# Patient Record
Sex: FEMALE | Race: WHITE | URBAN - METROPOLITAN AREA
[De-identification: names, ages, dates, MRNs, and addresses within clinical notes are randomized per-mention and may not be internally consistent; named-entity substitution may affect disease eponyms.]

---

## 2021-04-21 ENCOUNTER — OFFICE VISIT (OUTPATIENT)
Dept: URBAN - METROPOLITAN AREA CLINIC 60 | Facility: CLINIC | Age: 86
End: 2021-04-21

## 2021-05-07 ENCOUNTER — OFFICE VISIT (OUTPATIENT)
Dept: URBAN - METROPOLITAN AREA SURGERY CENTER 4 | Facility: SURGERY CENTER | Age: 86
End: 2021-05-07

## 2021-05-12 ENCOUNTER — OFFICE VISIT (OUTPATIENT)
Dept: URBAN - METROPOLITAN AREA CLINIC 60 | Facility: CLINIC | Age: 86
End: 2021-05-12

## 2021-05-26 ENCOUNTER — OFFICE VISIT (OUTPATIENT)
Dept: URBAN - METROPOLITAN AREA CLINIC 60 | Facility: CLINIC | Age: 86
End: 2021-05-26

## 2021-06-23 ENCOUNTER — OFFICE VISIT (OUTPATIENT)
Dept: URBAN - METROPOLITAN AREA CLINIC 60 | Facility: CLINIC | Age: 86
End: 2021-06-23

## 2021-06-30 ENCOUNTER — OFFICE VISIT (OUTPATIENT)
Dept: URBAN - METROPOLITAN AREA CLINIC 60 | Facility: CLINIC | Age: 86
End: 2021-06-30

## 2021-08-02 ENCOUNTER — OFFICE VISIT (OUTPATIENT)
Dept: URBAN - METROPOLITAN AREA CLINIC 60 | Facility: CLINIC | Age: 86
End: 2021-08-02

## 2022-02-23 ENCOUNTER — OFFICE VISIT (OUTPATIENT)
Dept: URBAN - METROPOLITAN AREA CLINIC 60 | Facility: CLINIC | Age: 87
End: 2022-02-23

## 2022-04-13 ENCOUNTER — OFFICE VISIT (OUTPATIENT)
Dept: URBAN - METROPOLITAN AREA CLINIC 60 | Facility: CLINIC | Age: 87
End: 2022-04-13

## 2022-05-18 ENCOUNTER — OFFICE VISIT (OUTPATIENT)
Dept: URBAN - METROPOLITAN AREA CLINIC 60 | Facility: CLINIC | Age: 87
End: 2022-05-18

## 2022-06-29 ENCOUNTER — OFFICE VISIT (OUTPATIENT)
Dept: URBAN - METROPOLITAN AREA CLINIC 60 | Facility: CLINIC | Age: 87
End: 2022-06-29

## 2022-07-08 ENCOUNTER — OFFICE VISIT (OUTPATIENT)
Dept: URBAN - METROPOLITAN AREA CLINIC 60 | Facility: CLINIC | Age: 87
End: 2022-07-08

## 2022-07-09 ENCOUNTER — TELEPHONE ENCOUNTER (OUTPATIENT)
Dept: URBAN - METROPOLITAN AREA CLINIC 121 | Facility: CLINIC | Age: 87
End: 2022-07-09

## 2022-07-09 RX ORDER — METHYLPREDNISOLONE 4 MG/1
TABLET ORAL
Refills: 0 | OUTPATIENT
Start: 2022-02-21 | End: 2022-02-23

## 2022-07-09 RX ORDER — OXCARBAZEPINE 300 MG/1
TABLET, FILM COATED ORAL
Refills: 0 | OUTPATIENT
Start: 2022-02-23 | End: 2022-04-13

## 2022-07-09 RX ORDER — ALPRAZOLAM 0.25 MG
TABLET ORAL
Refills: 0 | OUTPATIENT
Start: 2022-02-23 | End: 2022-04-13

## 2022-07-09 RX ORDER — CHOLECALCIFEROL (VITAMIN D3) 50 MCG
TABLET ORAL
Refills: 0 | OUTPATIENT
Start: 2021-04-21 | End: 2021-05-12

## 2022-07-09 RX ORDER — OXYCODONE HYDROCHLORIDE AND ACETAMINOPHEN 5; 325 MG/1; MG/1
TABLET ORAL
Refills: 0 | OUTPATIENT
Start: 2021-04-20 | End: 2021-04-21

## 2022-07-09 RX ORDER — MV-MIN/FOLIC/VIT K/LYCOP/COQ10 200-100MCG
CAPSULE ORAL
Refills: 0 | OUTPATIENT
Start: 2022-02-23 | End: 2022-04-13

## 2022-07-09 RX ORDER — GABAPENTIN 300 MG/1
CAPSULE ORAL
Refills: 0 | OUTPATIENT
Start: 2021-06-29 | End: 2021-08-02

## 2022-07-09 RX ORDER — ALPRAZOLAM 0.25 MG
TABLET ORAL
Refills: 0 | OUTPATIENT
Start: 2021-04-21 | End: 2021-04-21

## 2022-07-09 RX ORDER — ELECTROLYTES/DEXTROSE
SOLUTION, ORAL ORAL
Refills: 0 | OUTPATIENT
Start: 2022-02-23 | End: 2022-04-13

## 2022-07-09 RX ORDER — BRINZOLAMIDE 10 MG/ML
SUSPENSION/ DROPS OPHTHALMIC
Refills: 0 | OUTPATIENT
Start: 2022-04-18 | End: 2022-05-18

## 2022-07-09 RX ORDER — MV-MIN/FOLIC/VIT K/LYCOP/COQ10 200-100MCG
CAPSULE ORAL
Refills: 0 | OUTPATIENT
Start: 2021-08-02 | End: 2022-02-23

## 2022-07-09 RX ORDER — MV-MIN/FOLIC/VIT K/LYCOP/COQ10 200-100MCG
CAPSULE ORAL
Refills: 0 | OUTPATIENT
Start: 2021-04-21 | End: 2021-05-12

## 2022-07-09 RX ORDER — HYDROCODONE BITARTRATE AND ACETAMINOPHEN 5; 325 MG/1; MG/1
TABLET ORAL
Refills: 0 | OUTPATIENT
Start: 2022-03-30 | End: 2022-04-13

## 2022-07-09 RX ORDER — OXYCODONE HYDROCHLORIDE AND ACETAMINOPHEN 5; 325 MG/1; MG/1
TABLET ORAL AS NEEDED
Refills: 0 | OUTPATIENT
Start: 2021-04-21 | End: 2021-05-12

## 2022-07-09 RX ORDER — BRIMONIDINE TARTRATE, TIMOLOL MALEATE 2; 5 MG/ML; MG/ML
SOLUTION/ DROPS OPHTHALMIC
Refills: 0 | OUTPATIENT
Start: 2022-04-13 | End: 2022-05-18

## 2022-07-09 RX ORDER — BACLOFEN 10 MG/1
TABLET ORAL
Refills: 0 | OUTPATIENT
Start: 2022-03-16 | End: 2022-05-18

## 2022-07-09 RX ORDER — OXYCODONE HYDROCHLORIDE AND ACETAMINOPHEN 5; 325 MG/1; MG/1
TABLET ORAL AS NEEDED
Refills: 0 | OUTPATIENT
Start: 2022-02-23 | End: 2022-04-13

## 2022-07-09 RX ORDER — CIPROFLOXACIN HCL 500 MG
TWICE A DAY TABLET ORAL TWICE A DAY
Refills: 1 | OUTPATIENT
Start: 2021-04-30 | End: 2022-02-18

## 2022-07-09 RX ORDER — GABAPENTIN 300 MG/1
CAPSULE ORAL
Refills: 0 | OUTPATIENT
Start: 2022-04-13 | End: 2022-05-18

## 2022-07-09 RX ORDER — OXYCODONE HYDROCHLORIDE AND ACETAMINOPHEN 5; 325 MG/1; MG/1
TABLET ORAL AS NEEDED
Refills: 0 | OUTPATIENT
Start: 2022-04-13 | End: 2022-04-13

## 2022-07-09 RX ORDER — OXYCODONE HYDROCHLORIDE AND ACETAMINOPHEN 5; 325 MG/1; MG/1
TABLET ORAL AS NEEDED
Refills: 0 | OUTPATIENT
Start: 2021-05-12 | End: 2021-08-02

## 2022-07-09 RX ORDER — ALPRAZOLAM 0.25 MG
TABLET ORAL
Refills: 0 | OUTPATIENT
Start: 2021-04-21 | End: 2021-05-12

## 2022-07-09 RX ORDER — ALPRAZOLAM 0.25 MG
TABLET ORAL
Refills: 0 | OUTPATIENT
Start: 2022-05-18 | End: 2022-05-18

## 2022-07-09 RX ORDER — BRIMONIDINE TARTRATE, TIMOLOL MALEATE 2; 5 MG/ML; MG/ML
SOLUTION/ DROPS OPHTHALMIC
Refills: 0 | OUTPATIENT
Start: 2021-06-29 | End: 2022-02-23

## 2022-07-09 RX ORDER — MV-MIN/FOLIC/VIT K/LYCOP/COQ10 200-100MCG
CAPSULE ORAL
Refills: 0 | OUTPATIENT
Start: 2021-05-12 | End: 2021-08-02

## 2022-07-09 RX ORDER — OMEGA-3/DHA/EPA/FISH OIL 300-1000MG
CAPSULE ORAL
Refills: 0 | OUTPATIENT
Start: 2022-02-23 | End: 2022-04-13

## 2022-07-09 RX ORDER — IBUPROFEN 200 MG/1
TABLET, COATED ORAL ONCE A DAY
Refills: 0 | OUTPATIENT
Start: 2021-04-21 | End: 2021-05-12

## 2022-07-09 RX ORDER — ELECTROLYTES/DEXTROSE
SOLUTION, ORAL ORAL
Refills: 0 | OUTPATIENT
Start: 2022-04-13 | End: 2022-05-18

## 2022-07-09 RX ORDER — BACLOFEN 10 MG/1
TABLET ORAL
Refills: 0 | OUTPATIENT
Start: 2022-02-05 | End: 2022-02-23

## 2022-07-09 RX ORDER — ALPRAZOLAM 0.25 MG
TABLET ORAL
Refills: 0 | OUTPATIENT
Start: 2021-04-20 | End: 2021-04-21

## 2022-07-09 RX ORDER — IBUPROFEN 200 MG/1
TABLET, COATED ORAL ONCE A DAY
Refills: 0 | OUTPATIENT
Start: 2022-04-13 | End: 2022-05-18

## 2022-07-09 RX ORDER — OXCARBAZEPINE 300 MG/1
TABLET, FILM COATED ORAL
Refills: 0 | OUTPATIENT
Start: 2021-06-29 | End: 2022-02-23

## 2022-07-09 RX ORDER — OXCARBAZEPINE 600 MG/1
TABLET ORAL
Refills: 0 | OUTPATIENT
Start: 2021-04-20 | End: 2021-04-21

## 2022-07-09 RX ORDER — GABAPENTIN 300 MG/1
CAPSULE ORAL
Refills: 0 | OUTPATIENT
Start: 2022-02-21 | End: 2022-02-23

## 2022-07-09 RX ORDER — ALPRAZOLAM 0.25 MG
TABLET ORAL
Refills: 0 | OUTPATIENT
Start: 2021-08-02 | End: 2022-02-23

## 2022-07-09 RX ORDER — MV-MIN/FOLIC/VIT K/LYCOP/COQ10 200-100MCG
CAPSULE ORAL
Refills: 0 | OUTPATIENT
Start: 2022-04-13 | End: 2022-05-18

## 2022-07-09 RX ORDER — OXCARBAZEPINE 600 MG/1
TABLET ORAL
Refills: 0 | OUTPATIENT
Start: 2021-04-21 | End: 2021-05-12

## 2022-07-09 RX ORDER — METRONIDAZOLE 500 MG/1
THREE TIMES A DAY TABLET ORAL THREE TIMES A DAY
Refills: 0 | OUTPATIENT
Start: 2021-04-30 | End: 2022-02-18

## 2022-07-09 RX ORDER — OXCARBAZEPINE 600 MG/1
TABLET ORAL
Refills: 0 | OUTPATIENT
Start: 2021-08-02 | End: 2021-08-02

## 2022-07-09 RX ORDER — IBUPROFEN 200 MG/1
TABLET, COATED ORAL ONCE A DAY
Refills: 0 | OUTPATIENT
Start: 2021-05-12 | End: 2021-08-02

## 2022-07-09 RX ORDER — ALPRAZOLAM 0.25 MG
TABLET ORAL
Refills: 0 | OUTPATIENT
Start: 2022-04-13 | End: 2022-05-18

## 2022-07-09 RX ORDER — ALPRAZOLAM 0.25 MG
TABLET ORAL
Refills: 0 | OUTPATIENT
Start: 2021-05-12 | End: 2021-08-02

## 2022-07-09 RX ORDER — OXCARBAZEPINE 300 MG/1
TABLET, FILM COATED ORAL
Refills: 0 | OUTPATIENT
Start: 2022-04-13 | End: 2022-05-18

## 2022-07-09 RX ORDER — IBUPROFEN 200 MG/1
TABLET, COATED ORAL ONCE A DAY
Refills: 0 | OUTPATIENT
Start: 2021-08-02 | End: 2022-02-23

## 2022-07-09 RX ORDER — CHOLECALCIFEROL (VITAMIN D3) 50 MCG
TABLET ORAL
Refills: 0 | OUTPATIENT
Start: 2021-05-12 | End: 2021-08-02

## 2022-07-09 RX ORDER — GABAPENTIN 300 MG/1
CAPSULE ORAL
Refills: 0 | OUTPATIENT
Start: 2022-02-23 | End: 2022-04-13

## 2022-07-09 RX ORDER — OXYCODONE HYDROCHLORIDE AND ACETAMINOPHEN 5; 325 MG/1; MG/1
TABLET ORAL
Refills: 0 | OUTPATIENT
Start: 2021-04-21 | End: 2021-04-21

## 2022-07-09 RX ORDER — BRIMONIDINE TARTRATE, TIMOLOL MALEATE 2; 5 MG/ML; MG/ML
SOLUTION/ DROPS OPHTHALMIC
Refills: 0 | OUTPATIENT
Start: 2022-02-23 | End: 2022-04-13

## 2022-07-09 RX ORDER — CHOLECALCIFEROL (VITAMIN D3) 50 MCG
TABLET ORAL
Refills: 0 | OUTPATIENT
Start: 2021-08-02 | End: 2022-02-23

## 2022-07-09 RX ORDER — CHOLECALCIFEROL (VITAMIN D3) 50 MCG
TABLET ORAL
Refills: 0 | OUTPATIENT
Start: 2022-04-13 | End: 2022-05-18

## 2022-07-09 RX ORDER — OMEGA-3/DHA/EPA/FISH OIL 300-1000MG
CAPSULE ORAL
Refills: 0 | OUTPATIENT
Start: 2022-04-13 | End: 2022-05-18

## 2022-07-09 RX ORDER — IBUPROFEN 200 MG/1
TABLET, COATED ORAL ONCE A DAY
Refills: 0 | OUTPATIENT
Start: 2022-02-23 | End: 2022-04-13

## 2022-07-09 RX ORDER — OXCARBAZEPINE 600 MG/1
TABLET ORAL
Refills: 0 | OUTPATIENT
Start: 2021-05-12 | End: 2021-08-02

## 2022-07-09 RX ORDER — OXYCODONE HYDROCHLORIDE AND ACETAMINOPHEN 5; 325 MG/1; MG/1
TABLET ORAL AS NEEDED
Refills: 0 | OUTPATIENT
Start: 2021-08-02 | End: 2022-02-23

## 2022-07-09 RX ORDER — CHOLECALCIFEROL (VITAMIN D3) 50 MCG
TABLET ORAL
Refills: 0 | OUTPATIENT
Start: 2022-02-23 | End: 2022-04-13

## 2022-07-09 RX ORDER — LATANOPROSTENE BUNOD 0.24 MG/ML
SOLUTION/ DROPS OPHTHALMIC
Refills: 0 | OUTPATIENT
Start: 2022-05-12 | End: 2022-05-18

## 2022-07-10 ENCOUNTER — TELEPHONE ENCOUNTER (OUTPATIENT)
Dept: URBAN - METROPOLITAN AREA CLINIC 121 | Facility: CLINIC | Age: 87
End: 2022-07-10

## 2022-07-10 RX ORDER — METHYLPREDNISOLONE 4 MG/1
TABLET ORAL
Refills: 0 | Status: ACTIVE | COMMUNITY
Start: 2022-02-23

## 2022-07-10 RX ORDER — ELECTROLYTES/DEXTROSE
SOLUTION, ORAL ORAL
Refills: 0 | Status: ACTIVE | COMMUNITY
Start: 2022-05-18

## 2022-07-10 RX ORDER — GABAPENTIN 300 MG/1
CAPSULE ORAL
Refills: 0 | Status: ACTIVE | COMMUNITY
Start: 2022-05-18

## 2022-07-10 RX ORDER — IBUPROFEN 200 MG/1
TABLET, COATED ORAL ONCE A DAY
Refills: 0 | Status: ACTIVE | COMMUNITY
Start: 2022-05-18

## 2022-07-10 RX ORDER — OMEPRAZOLE 20 MG/1
ONCE A DAY TABLET, DELAYED RELEASE ORAL ONCE A DAY
Refills: 2 | Status: ACTIVE | COMMUNITY
Start: 2022-02-23

## 2022-07-10 RX ORDER — CHOLECALCIFEROL (VITAMIN D3) 50 MCG
TABLET ORAL
Refills: 0 | Status: ACTIVE | COMMUNITY
Start: 2022-05-18

## 2022-07-10 RX ORDER — LATANOPROSTENE BUNOD 0.24 MG/ML
SOLUTION/ DROPS OPHTHALMIC
Refills: 0 | Status: ACTIVE | COMMUNITY
Start: 2022-05-18

## 2022-07-10 RX ORDER — BRIMONIDINE TARTRATE, TIMOLOL MALEATE 2; 5 MG/ML; MG/ML
SOLUTION/ DROPS OPHTHALMIC
Refills: 0 | Status: ACTIVE | COMMUNITY
Start: 2022-05-18

## 2022-07-10 RX ORDER — BRINZOLAMIDE 10 MG/ML
SUSPENSION/ DROPS OPHTHALMIC
Refills: 0 | Status: ACTIVE | COMMUNITY
Start: 2022-05-18

## 2022-07-10 RX ORDER — GABAPENTIN 300 MG/1
CAPSULE ORAL
Refills: 0 | Status: ACTIVE | COMMUNITY
Start: 2021-08-02

## 2022-07-10 RX ORDER — ALPRAZOLAM 0.25 MG
TABLET ORAL
Refills: 0 | Status: ACTIVE | COMMUNITY
Start: 2022-05-18

## 2022-07-10 RX ORDER — OXCARBAZEPINE 300 MG/1
TABLET, FILM COATED ORAL
Refills: 0 | Status: ACTIVE | COMMUNITY
Start: 2022-05-18

## 2022-07-10 RX ORDER — OMEGA-3/DHA/EPA/FISH OIL 300-1000MG
CAPSULE ORAL
Refills: 0 | Status: ACTIVE | COMMUNITY
Start: 2022-05-18

## 2022-07-10 RX ORDER — BACLOFEN 10 MG/1
TABLET ORAL
Refills: 0 | Status: ACTIVE | COMMUNITY
Start: 2022-05-18

## 2022-07-10 RX ORDER — MV-MIN/FOLIC/VIT K/LYCOP/COQ10 200-100MCG
CAPSULE ORAL
Refills: 0 | Status: ACTIVE | COMMUNITY
Start: 2022-05-18

## 2022-07-10 RX ORDER — BACLOFEN 10 MG/1
TABLET ORAL
Refills: 0 | Status: ACTIVE | COMMUNITY
Start: 2022-02-23

## 2022-07-13 ENCOUNTER — OFFICE VISIT (OUTPATIENT)
Dept: URBAN - METROPOLITAN AREA CLINIC 60 | Facility: CLINIC | Age: 87
End: 2022-07-13
Payer: MEDICARE

## 2022-07-13 ENCOUNTER — WEB ENCOUNTER (OUTPATIENT)
Dept: URBAN - METROPOLITAN AREA CLINIC 60 | Facility: CLINIC | Age: 87
End: 2022-07-13

## 2022-07-13 VITALS
SYSTOLIC BLOOD PRESSURE: 128 MMHG | WEIGHT: 159.4 LBS | BODY MASS INDEX: 27.21 KG/M2 | HEART RATE: 72 BPM | RESPIRATION RATE: 12 BRPM | TEMPERATURE: 97 F | OXYGEN SATURATION: 97 % | HEIGHT: 64 IN | DIASTOLIC BLOOD PRESSURE: 72 MMHG

## 2022-07-13 DIAGNOSIS — K59.01 SLOW TRANSIT CONSTIPATION: ICD-10-CM

## 2022-07-13 PROBLEM — 35298007: Status: ACTIVE | Noted: 2022-07-13

## 2022-07-13 PROCEDURE — 99213 OFFICE O/P EST LOW 20 MIN: CPT | Performed by: PHYSICIAN ASSISTANT

## 2022-07-13 RX ORDER — ELECTROLYTES/DEXTROSE
SOLUTION, ORAL ORAL
Refills: 0 | Status: ACTIVE | COMMUNITY
Start: 2022-05-18

## 2022-07-13 RX ORDER — OXCARBAZEPINE 300 MG/1
TABLET, FILM COATED ORAL
Refills: 0 | Status: ACTIVE | COMMUNITY
Start: 2022-05-18

## 2022-07-13 RX ORDER — OMEPRAZOLE 20 MG/1
ONCE A DAY TABLET, DELAYED RELEASE ORAL ONCE A DAY
Refills: 2 | Status: ACTIVE | COMMUNITY
Start: 2022-02-23

## 2022-07-13 RX ORDER — METHYLPREDNISOLONE 4 MG/1
TABLET ORAL
Refills: 0 | Status: ACTIVE | COMMUNITY
Start: 2022-02-23

## 2022-07-13 RX ORDER — LATANOPROSTENE BUNOD 0.24 MG/ML
SOLUTION/ DROPS OPHTHALMIC
Refills: 0 | Status: ACTIVE | COMMUNITY
Start: 2022-05-18

## 2022-07-13 RX ORDER — MV-MIN/FOLIC/VIT K/LYCOP/COQ10 200-100MCG
CAPSULE ORAL
Refills: 0 | Status: ACTIVE | COMMUNITY
Start: 2022-05-18

## 2022-07-13 RX ORDER — BRINZOLAMIDE 10 MG/ML
SUSPENSION/ DROPS OPHTHALMIC
Refills: 0 | Status: ACTIVE | COMMUNITY
Start: 2022-05-18

## 2022-07-13 RX ORDER — BRIMONIDINE TARTRATE, TIMOLOL MALEATE 2; 5 MG/ML; MG/ML
SOLUTION/ DROPS OPHTHALMIC
Refills: 0 | Status: ACTIVE | COMMUNITY
Start: 2022-05-18

## 2022-07-13 RX ORDER — IBUPROFEN 200 MG/1
TABLET, COATED ORAL ONCE A DAY
Refills: 0 | Status: ACTIVE | COMMUNITY
Start: 2022-05-18

## 2022-07-13 RX ORDER — ALPRAZOLAM 0.25 MG
TABLET ORAL
Refills: 0 | Status: ACTIVE | COMMUNITY
Start: 2022-05-18

## 2022-07-13 RX ORDER — GABAPENTIN 300 MG/1
CAPSULE ORAL
Refills: 0 | Status: ACTIVE | COMMUNITY
Start: 2021-08-02

## 2022-07-13 RX ORDER — OMEGA-3/DHA/EPA/FISH OIL 300-1000MG
CAPSULE ORAL
Refills: 0 | Status: ACTIVE | COMMUNITY
Start: 2022-05-18

## 2022-07-13 RX ORDER — CHOLECALCIFEROL (VITAMIN D3) 50 MCG
TABLET ORAL
Refills: 0 | Status: ACTIVE | COMMUNITY
Start: 2022-05-18

## 2022-07-13 RX ORDER — BACLOFEN 10 MG/1
TABLET ORAL
Refills: 0 | Status: ACTIVE | COMMUNITY
Start: 2022-02-23

## 2022-07-13 NOTE — HPI-TODAY'S VISIT:
88-year-old female presented to the office in April reporting constipation.  She was having a bowel movement once every 3 days at that time.  On the days that she would have a bowel movement, her stools would be hard at first and then would become more soft.  She was advised to start MiraLAX and an over-the-counter stool softener and adjust the dose as needed. She was also incidentally found to have a pancreatic cyst on CT done in April 2021.  Follow-up MRI in July 2021 had no concerning features.  She was sent for repeat CT abdomen/pelvis which was done on May 5 which demonstrated a very slight increase in the size of the pancreatic proximal body cyst though still under 1 cm in size.  A probable tiny uncinate process cyst was also noted without main duct dilation.  Repeat CT was recommended in 6 months for surveillance. She was last seen in the office on May 18.  She was not consistently using MiraLAX at that time.  She reported constipation and lower abdominal cramping when she would go multiple days without having a bowel movement.  She denied melena or hematochezia.  She was advised to start using MiraLAX 17 g every day and adjust the dose as needed for regularity.  She presents back to the office today with complaints continued constipation. She has not been using miralax regularly because she forgets to take it. She will go up to 5 days without having a BM and then she will use miralax and she will have soft stools.

## 2022-11-16 ENCOUNTER — OFFICE VISIT (OUTPATIENT)
Dept: URBAN - METROPOLITAN AREA CLINIC 60 | Facility: CLINIC | Age: 87
End: 2022-11-16
Payer: MEDICARE

## 2022-11-16 VITALS
DIASTOLIC BLOOD PRESSURE: 68 MMHG | HEART RATE: 66 BPM | SYSTOLIC BLOOD PRESSURE: 128 MMHG | WEIGHT: 161.2 LBS | TEMPERATURE: 97.6 F | RESPIRATION RATE: 12 BRPM | OXYGEN SATURATION: 95 % | BODY MASS INDEX: 27.52 KG/M2 | HEIGHT: 64 IN

## 2022-11-16 DIAGNOSIS — K59.00 CONSTIPATION, UNSPECIFIED CONSTIPATION TYPE: ICD-10-CM

## 2022-11-16 DIAGNOSIS — K86.2 PANCREATIC CYST: ICD-10-CM

## 2022-11-16 PROCEDURE — 99214 OFFICE O/P EST MOD 30 MIN: CPT | Performed by: INTERNAL MEDICINE

## 2022-11-16 RX ORDER — ALPRAZOLAM 0.25 MG
TABLET ORAL
Refills: 0 | Status: ACTIVE | COMMUNITY
Start: 2022-05-18

## 2022-11-16 RX ORDER — OMEGA-3/DHA/EPA/FISH OIL 300-1000MG
CAPSULE ORAL
Refills: 0 | Status: ACTIVE | COMMUNITY
Start: 2022-05-18

## 2022-11-16 RX ORDER — FUROSEMIDE 20 MG/1
1 TABLET TABLET ORAL ONCE A DAY
Status: ACTIVE | COMMUNITY

## 2022-11-16 RX ORDER — OMEPRAZOLE 20 MG/1
ONCE A DAY TABLET, DELAYED RELEASE ORAL ONCE A DAY
Refills: 2 | Status: ACTIVE | COMMUNITY
Start: 2022-02-23

## 2022-11-16 RX ORDER — ELECTROLYTES/DEXTROSE
SOLUTION, ORAL ORAL
Refills: 0 | Status: ACTIVE | COMMUNITY
Start: 2022-05-18

## 2022-11-16 RX ORDER — BACLOFEN 10 MG/1
TABLET ORAL
Refills: 0 | Status: ACTIVE | COMMUNITY
Start: 2022-02-23

## 2022-11-16 RX ORDER — LATANOPROSTENE BUNOD 0.24 MG/ML
SOLUTION/ DROPS OPHTHALMIC
Refills: 0 | Status: ACTIVE | COMMUNITY
Start: 2022-05-18

## 2022-11-16 RX ORDER — METHYLPREDNISOLONE 4 MG/1
TABLET ORAL
Refills: 0 | Status: ACTIVE | COMMUNITY
Start: 2022-02-23

## 2022-11-16 RX ORDER — IBUPROFEN 200 MG/1
TABLET, COATED ORAL ONCE A DAY
Refills: 0 | Status: ACTIVE | COMMUNITY
Start: 2022-05-18

## 2022-11-16 RX ORDER — OXCARBAZEPINE 300 MG/1
TABLET, FILM COATED ORAL
Refills: 0 | Status: ACTIVE | COMMUNITY
Start: 2022-05-18

## 2022-11-16 RX ORDER — CHOLECALCIFEROL (VITAMIN D3) 50 MCG
TABLET ORAL
Refills: 0 | Status: ACTIVE | COMMUNITY
Start: 2022-05-18

## 2022-11-16 RX ORDER — BRIMONIDINE TARTRATE, TIMOLOL MALEATE 2; 5 MG/ML; MG/ML
SOLUTION/ DROPS OPHTHALMIC
Refills: 0 | Status: ACTIVE | COMMUNITY
Start: 2022-05-18

## 2022-11-16 RX ORDER — MV-MIN/FOLIC/VIT K/LYCOP/COQ10 200-100MCG
CAPSULE ORAL
Refills: 0 | Status: ACTIVE | COMMUNITY
Start: 2022-05-18

## 2022-11-16 RX ORDER — BRINZOLAMIDE 10 MG/ML
SUSPENSION/ DROPS OPHTHALMIC
Refills: 0 | Status: ACTIVE | COMMUNITY
Start: 2022-05-18

## 2022-11-16 RX ORDER — GABAPENTIN 300 MG/1
CAPSULE ORAL
Refills: 0 | Status: ACTIVE | COMMUNITY
Start: 2021-08-02

## 2022-11-16 NOTE — HPI-TODAY'S VISIT:
Guadalupe is a 89-year-old female that presents today for follow-up.  She has been following in our office due to history of constipation and history of pancreatic cyst.   She was last seen in our office 07/2022.  At that time she was complaining of constipation.  However, she was not using her MiraLAX on a regular basis. She says that she uses the Miralax occasionally. She says that she will move her bowels every 1-3 days, but the stools is soft. Denies frequent diarrhea. Denies blood in the stool. Denies abdominal pain.  CT abdomen pelvis with and without contrast 05/2022.  There was a pancreatic cyst in the proximal body of the pancreas measuring about 9 mm.  There is also a probable 2 mm pancreatic cyst in the uncinate process.  No pancreatic duct dilation.  CT abdomen and pelvis with contrast 04/2021.  There was a 7 mm lesion within the neck of the pancreas which could not be completely characterized.  There was mild biliary dilation postcholecystectomy.  There was a diverticulum in the duodenum adjacent to the ampulla.  There was mild diverticulosis in the descending and sigmoid colon with mild circumferential wall thickening in the sigmoid colon, possibly mild uncomplicated diverticulitis. MRI of the abdomen with and without contrast 07/2021.  This was a stand-up MRI and the examination was limited by body habitus and motion artifact.  The liver appeared normal.  Patient is postcholecystectomy.  There was a 7.2 mm lesion in the tail of the pancreas and a 1.2 cm lesion in the distal body of the pancreas.  There were bilateral renal cyst with a complex appearing cyst with thick irregular septation in the right kidney.

## 2023-01-18 ENCOUNTER — OFFICE VISIT (OUTPATIENT)
Dept: URBAN - METROPOLITAN AREA CLINIC 60 | Facility: CLINIC | Age: 88
End: 2023-01-18
Payer: MEDICARE

## 2023-01-18 VITALS
HEIGHT: 64 IN | SYSTOLIC BLOOD PRESSURE: 124 MMHG | HEART RATE: 56 BPM | BODY MASS INDEX: 27.21 KG/M2 | DIASTOLIC BLOOD PRESSURE: 68 MMHG | WEIGHT: 159.4 LBS | TEMPERATURE: 97.5 F | OXYGEN SATURATION: 97 %

## 2023-01-18 DIAGNOSIS — K86.2 PANCREATIC CYST: ICD-10-CM

## 2023-01-18 DIAGNOSIS — K59.00 CONSTIPATION, UNSPECIFIED CONSTIPATION TYPE: ICD-10-CM

## 2023-01-18 DIAGNOSIS — K58.1 IRRITABLE BOWEL SYNDROME WITH CONSTIPATION: ICD-10-CM

## 2023-01-18 PROBLEM — 14760008: Status: ACTIVE | Noted: 2022-11-16

## 2023-01-18 PROBLEM — 440630006: Status: ACTIVE | Noted: 2023-01-18

## 2023-01-18 PROCEDURE — 99214 OFFICE O/P EST MOD 30 MIN: CPT | Performed by: INTERNAL MEDICINE

## 2023-01-18 RX ORDER — LATANOPROSTENE BUNOD 0.24 MG/ML
SOLUTION/ DROPS OPHTHALMIC
Refills: 0 | Status: ACTIVE | COMMUNITY
Start: 2022-05-18

## 2023-01-18 RX ORDER — OMEGA-3/DHA/EPA/FISH OIL 300-1000MG
CAPSULE ORAL
Refills: 0 | Status: ACTIVE | COMMUNITY
Start: 2022-05-18

## 2023-01-18 RX ORDER — MV-MIN/FOLIC/VIT K/LYCOP/COQ10 200-100MCG
CAPSULE ORAL
Refills: 0 | Status: ACTIVE | COMMUNITY
Start: 2022-05-18

## 2023-01-18 RX ORDER — METHYLPREDNISOLONE 4 MG/1
TABLET ORAL
Refills: 0 | Status: ACTIVE | COMMUNITY
Start: 2022-02-23

## 2023-01-18 RX ORDER — BACLOFEN 10 MG/1
TABLET ORAL
Refills: 0 | Status: ACTIVE | COMMUNITY
Start: 2022-02-23

## 2023-01-18 RX ORDER — ALPRAZOLAM 0.25 MG
TABLET ORAL
Refills: 0 | Status: ACTIVE | COMMUNITY
Start: 2022-05-18

## 2023-01-18 RX ORDER — GABAPENTIN 100 MG/1
1 CAPSULE CAPSULE ORAL ONCE A DAY
Refills: 0 | Status: ACTIVE | COMMUNITY
Start: 2021-08-02

## 2023-01-18 RX ORDER — DICYCLOMINE HYDROCHLORIDE 10 MG/1
1 CAPSULE CAPSULE ORAL
Qty: 60 | Refills: 1 | OUTPATIENT
Start: 2023-01-18 | End: 2023-03-19

## 2023-01-18 RX ORDER — OMEPRAZOLE 20 MG/1
ONCE A DAY TABLET, DELAYED RELEASE ORAL ONCE A DAY
Refills: 2 | Status: ACTIVE | COMMUNITY
Start: 2022-02-23

## 2023-01-18 RX ORDER — OXCARBAZEPINE 150 MG/1
1 TABLET TABLET, FILM COATED ORAL TWICE A DAY
Refills: 0 | Status: ACTIVE | COMMUNITY
Start: 2022-05-18

## 2023-01-18 RX ORDER — IBUPROFEN 200 MG/1
TABLET, COATED ORAL ONCE A DAY
Refills: 0 | Status: ACTIVE | COMMUNITY
Start: 2022-05-18

## 2023-01-18 RX ORDER — BRINZOLAMIDE 10 MG/ML
SUSPENSION/ DROPS OPHTHALMIC
Refills: 0 | Status: ACTIVE | COMMUNITY
Start: 2022-05-18

## 2023-01-18 RX ORDER — BRIMONIDINE TARTRATE, TIMOLOL MALEATE 2; 5 MG/ML; MG/ML
SOLUTION/ DROPS OPHTHALMIC
Refills: 0 | Status: ACTIVE | COMMUNITY
Start: 2022-05-18

## 2023-01-18 RX ORDER — ELECTROLYTES/DEXTROSE
SOLUTION, ORAL ORAL
Refills: 0 | Status: ACTIVE | COMMUNITY
Start: 2022-05-18

## 2023-01-18 RX ORDER — CHOLECALCIFEROL (VITAMIN D3) 50 MCG
TABLET ORAL
Refills: 0 | Status: ACTIVE | COMMUNITY
Start: 2022-05-18

## 2023-01-18 RX ORDER — FUROSEMIDE 20 MG/1
1 TABLET TABLET ORAL ONCE A DAY
Status: ACTIVE | COMMUNITY

## 2023-01-18 NOTE — HPI-TODAY'S VISIT:
Guadalupe is a 89-year-old female that presents today for follow-up.  She has been following in our office due to history of constipation and history of pancreatic cyst.   She was last seen in our office 11/2022.  She is complaining of constipation and altered bowel habits. She will not move her bowels for about 3 days. She will then have several bowel movements over the course of 1 days with lower abdominal pain and cramping. Her cramping resolves and thens she will not move her bowels again for several days. She takes fiber gummies. She will sometimes take Miralax, but is not taking this regularly. Denies blood in the stool. Denies unintentional weight loss. Denies nausea or vomiting. CT abdomen pelvis with and without contrast 05/2022: There was a pancreatic cyst in the proximal body of the pancreas measuring about 9 mm.  There is also a probable 2 mm pancreatic cyst in the uncinate process.  No pancreatic duct dilation.  CT abdomen and pelvis with contrast 04/2021:  There was a 7 mm lesion within the neck of the pancreas which could not be completely characterized.  There was mild biliary dilation postcholecystectomy.  There was a diverticulum in the duodenum adjacent to the ampulla.  There was mild diverticulosis in the descending and sigmoid colon with mild circumferential wall thickening in the sigmoid colon, possibly mild uncomplicated diverticulitis. MRI of the abdomen with and without contrast 07/202:  This was a stand-up MRI and the examination was limited by body habitus and motion artifact.  The liver appeared normal.  Patient is postcholecystectomy.  There was a 7.2 mm lesion in the tail of the pancreas and a 1.2 cm lesion in the body of the pancreas.  There were bilateral renal cyst with a complex appearing cyst with thick irregular septation in the right kidney.

## 2023-06-12 ENCOUNTER — TELEPHONE ENCOUNTER (OUTPATIENT)
Dept: URBAN - METROPOLITAN AREA CLINIC 63 | Facility: CLINIC | Age: 88
End: 2023-06-12

## 2023-06-12 ENCOUNTER — LAB OUTSIDE AN ENCOUNTER (OUTPATIENT)
Dept: URBAN - METROPOLITAN AREA CLINIC 63 | Facility: CLINIC | Age: 88
End: 2023-06-12

## 2023-06-16 ENCOUNTER — CLAIMS CREATED FROM THE CLAIM WINDOW (OUTPATIENT)
Dept: URBAN - METROPOLITAN AREA MEDICAL CENTER 14 | Facility: MEDICAL CENTER | Age: 88
End: 2023-06-16
Payer: MEDICARE

## 2023-06-16 ENCOUNTER — TELEPHONE ENCOUNTER (OUTPATIENT)
Dept: URBAN - METROPOLITAN AREA CLINIC 63 | Facility: CLINIC | Age: 88
End: 2023-06-16

## 2023-06-16 DIAGNOSIS — Z80.0 FAMILY HISTORY OF ESOPHAGEAL CANCER: ICD-10-CM

## 2023-06-16 DIAGNOSIS — R10.9 ABDOMINAL PAIN: ICD-10-CM

## 2023-06-16 DIAGNOSIS — K57.32 DIVERTICULITIS LARGE INTESTINE: ICD-10-CM

## 2023-06-16 PROBLEM — 307496006: Status: ACTIVE | Noted: 2023-06-16

## 2023-06-16 PROCEDURE — 99223 1ST HOSP IP/OBS HIGH 75: CPT | Performed by: INTERNAL MEDICINE

## 2023-06-17 ENCOUNTER — CLAIMS CREATED FROM THE CLAIM WINDOW (OUTPATIENT)
Dept: URBAN - METROPOLITAN AREA MEDICAL CENTER 14 | Facility: MEDICAL CENTER | Age: 88
End: 2023-06-17
Payer: MEDICARE

## 2023-06-17 DIAGNOSIS — K57.20 PERFORATION OF SIGMOID COLON DUE TO DIVERTICULITIS: ICD-10-CM

## 2023-06-17 DIAGNOSIS — R10.9 ABDOMINAL PAIN: ICD-10-CM

## 2023-06-17 PROCEDURE — 99233 SBSQ HOSP IP/OBS HIGH 50: CPT | Performed by: NURSE PRACTITIONER

## 2023-06-18 ENCOUNTER — CLAIMS CREATED FROM THE CLAIM WINDOW (OUTPATIENT)
Dept: URBAN - METROPOLITAN AREA MEDICAL CENTER 14 | Facility: MEDICAL CENTER | Age: 88
End: 2023-06-18
Payer: MEDICARE

## 2023-06-18 DIAGNOSIS — K57.20 PERFORATION OF SIGMOID COLON DUE TO DIVERTICULITIS: ICD-10-CM

## 2023-06-18 PROCEDURE — 99233 SBSQ HOSP IP/OBS HIGH 50: CPT | Performed by: INTERNAL MEDICINE

## 2023-06-19 ENCOUNTER — CLAIMS CREATED FROM THE CLAIM WINDOW (OUTPATIENT)
Dept: URBAN - METROPOLITAN AREA MEDICAL CENTER 14 | Facility: MEDICAL CENTER | Age: 88
End: 2023-06-19
Payer: MEDICARE

## 2023-06-19 DIAGNOSIS — K57.20 PERFORATION OF SIGMOID COLON DUE TO DIVERTICULITIS: ICD-10-CM

## 2023-06-19 PROCEDURE — 99233 SBSQ HOSP IP/OBS HIGH 50: CPT | Performed by: INTERNAL MEDICINE

## 2023-07-24 ENCOUNTER — TELEPHONE ENCOUNTER (OUTPATIENT)
Dept: URBAN - METROPOLITAN AREA CLINIC 60 | Facility: CLINIC | Age: 88
End: 2023-07-24

## 2023-09-20 ENCOUNTER — OFFICE VISIT (OUTPATIENT)
Dept: URBAN - METROPOLITAN AREA CLINIC 60 | Facility: CLINIC | Age: 88
End: 2023-09-20
Payer: MEDICARE

## 2023-09-20 VITALS
DIASTOLIC BLOOD PRESSURE: 80 MMHG | SYSTOLIC BLOOD PRESSURE: 120 MMHG | TEMPERATURE: 98.4 F | OXYGEN SATURATION: 95 % | HEART RATE: 69 BPM | BODY MASS INDEX: 25.61 KG/M2 | WEIGHT: 150 LBS | HEIGHT: 64 IN

## 2023-09-20 DIAGNOSIS — K58.1 IRRITABLE BOWEL SYNDROME WITH CONSTIPATION: ICD-10-CM

## 2023-09-20 DIAGNOSIS — K57.92 DIVERTICULITIS: ICD-10-CM

## 2023-09-20 DIAGNOSIS — K86.2 PANCREATIC CYST: ICD-10-CM

## 2023-09-20 PROCEDURE — 99214 OFFICE O/P EST MOD 30 MIN: CPT | Performed by: PHYSICIAN ASSISTANT

## 2023-09-20 RX ORDER — BRINZOLAMIDE 10 MG/ML
SUSPENSION/ DROPS OPHTHALMIC
Refills: 0 | Status: ACTIVE | COMMUNITY
Start: 2022-05-18

## 2023-09-20 RX ORDER — OXYCODONE HYDROCHLORIDE AND ACETAMINOPHEN 5; 325 MG/1; MG/1
TABLET ORAL
Qty: 60 TABLET | Status: ACTIVE | COMMUNITY

## 2023-09-20 RX ORDER — BRIMONIDINE TARTRATE, TIMOLOL MALEATE 2; 5 MG/ML; MG/ML
SOLUTION/ DROPS OPHTHALMIC
Refills: 0 | Status: ACTIVE | COMMUNITY
Start: 2022-05-18

## 2023-09-20 RX ORDER — MV-MIN/FOLIC/VIT K/LYCOP/COQ10 200-100MCG
CAPSULE ORAL
Refills: 0 | Status: ACTIVE | COMMUNITY
Start: 2022-05-18

## 2023-09-20 RX ORDER — NITROFURANTOIN MONOHYDRATE/MACROCRYSTALLINE 25; 75 MG/1; MG/1
CAPSULE ORAL
Qty: 10 CAPSULE | Status: ACTIVE | COMMUNITY

## 2023-09-20 RX ORDER — CHOLECALCIFEROL (VITAMIN D3) 50 MCG
TABLET ORAL
Refills: 0 | Status: ACTIVE | COMMUNITY
Start: 2022-05-18

## 2023-09-20 RX ORDER — LATANOPROSTENE BUNOD 0.24 MG/ML
SOLUTION/ DROPS OPHTHALMIC
Refills: 0 | Status: ACTIVE | COMMUNITY
Start: 2022-05-18

## 2023-09-20 RX ORDER — TORSEMIDE 10 MG/1
TABLET ORAL
Qty: 30 TABLET | Status: ACTIVE | COMMUNITY

## 2023-09-20 RX ORDER — GABAPENTIN 100 MG/1
1 CAPSULE CAPSULE ORAL ONCE A DAY
Refills: 0 | Status: ACTIVE | COMMUNITY
Start: 2021-08-02

## 2023-09-20 RX ORDER — VIBEGRON 75 MG/1
TABLET, FILM COATED ORAL
Qty: 30 TABLET | Status: ACTIVE | COMMUNITY

## 2023-09-20 RX ORDER — BACLOFEN 10 MG/1
TABLET ORAL
Refills: 0 | Status: ACTIVE | COMMUNITY
Start: 2022-02-23

## 2023-09-20 RX ORDER — OXCARBAZEPINE 150 MG/1
1 TABLET TABLET, FILM COATED ORAL TWICE A DAY
Refills: 0 | Status: ACTIVE | COMMUNITY
Start: 2022-05-18

## 2023-09-20 RX ORDER — ALPRAZOLAM 0.25 MG
TABLET ORAL
Refills: 0 | Status: ACTIVE | COMMUNITY
Start: 2022-05-18

## 2023-09-20 RX ORDER — OMEGA-3/DHA/EPA/FISH OIL 300-1000MG
CAPSULE ORAL
Refills: 0 | Status: ACTIVE | COMMUNITY
Start: 2022-05-18

## 2023-09-20 NOTE — HPI-TODAY'S VISIT:
89-year-old female with GI history significant for chronic constipation and history of pancreatic cyst presents for follow-up.  She was last seen in the office in January 2023 complaining of constipation.  She will go about 3 days without having a bowel movement and then will have several bowel movements in the course of 1 day with lower abdominal pain and cramping.  She takes fiber Gummies and sometimes uses MiraLAX but not on a regular basis.  She had no other GI complaints.  She was advised to use MiraLAX once a day.  She was given dicyclomine to use twice daily as needed for abdominal pain and cramping.  She phoned into the office in June 2023 reporting 2 days of moderately severe left lower quadrant abdominal pain.  CT scan of the abdomen and pelvis was done with contrast on June 16, 2023 which demonstrated small ascites and extensive inflammatory changes involving the lower abdomen and pelvis.  A primary consideration would be given to acute diverticulitis complicated by perforation.  Most likely, site of perforation sealed.  There was no evidence for intraperitoneal gas.  Prior cholecystectomy and compensatory dilation of the CBD.  Benign-appearing cystic lesion in the pancreas, appeared smaller on current exam.  She was advised after her CT to go to the hospital and was admitted to [facility] on June 16, 2023.  She was seen by general surgery and was started on IV antibiotics with ceftriaxone and metronidazole.  She was also followed by infectious disease specialist and gastroenterology.  She was managed conservatively initially.  She had a repeat CT scan which showed more free air and she had an increasing white blood cell count up to 18,000.  She underwent exploratory laparotomy, drainage of purulent peritonitis and Glover's procedure with Dr. Hoyt on June 19, 2023.  The pathology showed diverticular disease, with rupture and inflammation.  No definitive evidence of malignancy.  1 small benign lymph node identified.  Her cultures grew ESBL and Klebsiella pneumoniae.  She was treated with Invanz. Her white blood cell count trended down after surgery.  Her postoperative course was uneventful and she was discharged on June 28, 2023.  She follows up today doing well. She has chronic constipation. She is taking fiber gummies every day but is not using miralax consistently. She will not have a BM for a few days, then will take a laxative and have mulitple BMs over the course of one day. Denies any blood in the stool. She is not having any problems with her colostomy. She is scheduled for a barium enema next week. She states she has follow up with Dr Torres in October 2023 to discuss reversing her colostomy.   CT scan of the abdomen and pelvis with or without contrast in May 2022 demonstrated a pancreatic cyst in the proximal body of the pancreas measuring about 9 mm.  There is was also a probable 2 mm pancreatic cyst in the uncinate process.  No pancreatic duct dilation.  Last colonoscopy was done in 2011 with an 8 mm polyp removed.

## 2024-03-14 ENCOUNTER — OV EP (OUTPATIENT)
Dept: URBAN - METROPOLITAN AREA CLINIC 63 | Facility: CLINIC | Age: 89
End: 2024-03-14

## 2024-03-14 RX ORDER — OXCARBAZEPINE 150 MG/1
1 TABLET TABLET, FILM COATED ORAL TWICE A DAY
Refills: 0 | Status: ACTIVE | COMMUNITY
Start: 2022-05-18

## 2024-03-14 RX ORDER — TORSEMIDE 10 MG/1
TABLET ORAL
Qty: 30 TABLET | Status: ACTIVE | COMMUNITY

## 2024-03-14 RX ORDER — OMEGA-3/DHA/EPA/FISH OIL 300-1000MG
CAPSULE ORAL
Refills: 0 | Status: ACTIVE | COMMUNITY
Start: 2022-05-18

## 2024-03-14 RX ORDER — OXYCODONE HYDROCHLORIDE AND ACETAMINOPHEN 5; 325 MG/1; MG/1
TABLET ORAL
Qty: 60 TABLET | Status: ACTIVE | COMMUNITY

## 2024-03-14 RX ORDER — ALPRAZOLAM 0.25 MG
TABLET ORAL
Refills: 0 | Status: ACTIVE | COMMUNITY
Start: 2022-05-18

## 2024-03-14 RX ORDER — BACLOFEN 10 MG/1
TABLET ORAL
Refills: 0 | Status: ACTIVE | COMMUNITY
Start: 2022-02-23

## 2024-03-14 RX ORDER — CHOLECALCIFEROL (VITAMIN D3) 50 MCG
TABLET ORAL
Refills: 0 | Status: ACTIVE | COMMUNITY
Start: 2022-05-18

## 2024-03-14 RX ORDER — MV-MIN/FOLIC/VIT K/LYCOP/COQ10 200-100MCG
CAPSULE ORAL
Refills: 0 | Status: ACTIVE | COMMUNITY
Start: 2022-05-18

## 2024-03-14 RX ORDER — LATANOPROSTENE BUNOD 0.24 MG/ML
SOLUTION/ DROPS OPHTHALMIC
Refills: 0 | Status: ACTIVE | COMMUNITY
Start: 2022-05-18

## 2024-03-14 RX ORDER — BRINZOLAMIDE 10 MG/ML
SUSPENSION/ DROPS OPHTHALMIC
Refills: 0 | Status: ACTIVE | COMMUNITY
Start: 2022-05-18

## 2024-03-14 RX ORDER — VIBEGRON 75 MG/1
TABLET, FILM COATED ORAL
Qty: 30 TABLET | Status: ACTIVE | COMMUNITY

## 2024-03-14 RX ORDER — NITROFURANTOIN MONOHYDRATE/MACROCRYSTALLINE 25; 75 MG/1; MG/1
CAPSULE ORAL
Qty: 10 CAPSULE | Status: ACTIVE | COMMUNITY

## 2024-03-14 RX ORDER — BRIMONIDINE TARTRATE, TIMOLOL MALEATE 2; 5 MG/ML; MG/ML
SOLUTION/ DROPS OPHTHALMIC
Refills: 0 | Status: ACTIVE | COMMUNITY
Start: 2022-05-18

## 2024-03-14 RX ORDER — GABAPENTIN 100 MG/1
1 CAPSULE CAPSULE ORAL ONCE A DAY
Refills: 0 | Status: ACTIVE | COMMUNITY
Start: 2021-08-02

## 2024-03-15 ENCOUNTER — OV EP (OUTPATIENT)
Dept: URBAN - METROPOLITAN AREA CLINIC 63 | Facility: CLINIC | Age: 89
End: 2024-03-15
Payer: MEDICARE

## 2024-03-15 VITALS
HEIGHT: 64 IN | WEIGHT: 147.2 LBS | SYSTOLIC BLOOD PRESSURE: 120 MMHG | OXYGEN SATURATION: 97 % | RESPIRATION RATE: 20 BRPM | TEMPERATURE: 96.9 F | HEART RATE: 72 BPM | DIASTOLIC BLOOD PRESSURE: 60 MMHG | BODY MASS INDEX: 25.13 KG/M2

## 2024-03-15 DIAGNOSIS — K59.00 CONSTIPATION, UNSPECIFIED CONSTIPATION TYPE: ICD-10-CM

## 2024-03-15 DIAGNOSIS — K86.2 PANCREATIC CYST: ICD-10-CM

## 2024-03-15 DIAGNOSIS — K57.92 DIVERTICULITIS: ICD-10-CM

## 2024-03-15 PROCEDURE — 99213 OFFICE O/P EST LOW 20 MIN: CPT | Performed by: INTERNAL MEDICINE

## 2024-03-15 RX ORDER — NITROFURANTOIN MONOHYDRATE/MACROCRYSTALLINE 25; 75 MG/1; MG/1
CAPSULE ORAL
Qty: 10 CAPSULE | Status: ACTIVE | COMMUNITY

## 2024-03-15 RX ORDER — MV-MIN/FOLIC/VIT K/LYCOP/COQ10 200-100MCG
CAPSULE ORAL
Refills: 0 | Status: ACTIVE | COMMUNITY
Start: 2022-05-18

## 2024-03-15 RX ORDER — OXYCODONE HYDROCHLORIDE AND ACETAMINOPHEN 5; 325 MG/1; MG/1
TABLET ORAL
Qty: 60 TABLET | Status: ACTIVE | COMMUNITY

## 2024-03-15 RX ORDER — TORSEMIDE 10 MG/1
TABLET ORAL
Qty: 30 TABLET | Status: ACTIVE | COMMUNITY

## 2024-03-15 RX ORDER — ALPRAZOLAM 0.25 MG
TABLET ORAL
Refills: 0 | Status: ACTIVE | COMMUNITY
Start: 2022-05-18

## 2024-03-15 RX ORDER — CHOLECALCIFEROL (VITAMIN D3) 50 MCG
TABLET ORAL
Refills: 0 | Status: ACTIVE | COMMUNITY
Start: 2022-05-18

## 2024-03-15 RX ORDER — BRIMONIDINE TARTRATE, TIMOLOL MALEATE 2; 5 MG/ML; MG/ML
SOLUTION/ DROPS OPHTHALMIC
Refills: 0 | Status: ACTIVE | COMMUNITY
Start: 2022-05-18

## 2024-03-15 RX ORDER — GABAPENTIN 100 MG/1
1 CAPSULE CAPSULE ORAL ONCE A DAY
Refills: 0 | Status: ACTIVE | COMMUNITY
Start: 2021-08-02

## 2024-03-15 RX ORDER — OXCARBAZEPINE 150 MG/1
1 TABLET TABLET, FILM COATED ORAL TWICE A DAY
Refills: 0 | Status: ACTIVE | COMMUNITY
Start: 2022-05-18

## 2024-03-15 RX ORDER — BACLOFEN 10 MG/1
TABLET ORAL
Refills: 0 | Status: ACTIVE | COMMUNITY
Start: 2022-02-23

## 2024-03-15 RX ORDER — BRINZOLAMIDE 10 MG/ML
SUSPENSION/ DROPS OPHTHALMIC
Refills: 0 | Status: ACTIVE | COMMUNITY
Start: 2022-05-18

## 2024-03-15 RX ORDER — OMEGA-3/DHA/EPA/FISH OIL 300-1000MG
CAPSULE ORAL
Refills: 0 | Status: ACTIVE | COMMUNITY
Start: 2022-05-18

## 2024-03-15 RX ORDER — VIBEGRON 75 MG/1
TABLET, FILM COATED ORAL
Qty: 30 TABLET | Status: ACTIVE | COMMUNITY

## 2024-03-15 RX ORDER — LATANOPROSTENE BUNOD 0.24 MG/ML
SOLUTION/ DROPS OPHTHALMIC
Refills: 0 | Status: ACTIVE | COMMUNITY
Start: 2022-05-18

## 2024-03-15 NOTE — HPI-TODAY'S VISIT:
Guadalupe is a 89-year-old female that presents today for follow-up.  GI history significant for pancreatic cyst, chronic constipation, complicaed diverticulitis.  She was hospitalized 06/2023 with acute complicated sigmoid diverticulitis with perforation. She underwent sigmoid colon resection with colostomy and raman pouch. 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   She has no complaints at this time. She has chronic constipation but has been moving her bowels. Denies abdominal pain. Denies nausea or vomiting. Denies unintentional weight loss. Denies jaundice. Denies blood in the stool.  Prior imaging summarized below. However, she had more recent CT with contrast 06/2023 and CT without contrast 01/2024. Per CT report 06/2023, the pancreatic cystic lesion appeared smaller. There was no mention of the cyst on CT without contrast 01/2024, but exam limited without contrast.  CT abdomen pelvis with and without contrast 05/2022: There was a pancreatic cyst in the proximal body of the pancreas measuring about 9 mm.  There is also a probable 2 mm pancreatic cyst in the uncinate process.  No pancreatic duct dilation.    CT abdomen and pelvis with contrast 04/2021:  There was a 7 mm lesion within the neck of the pancreas which could not be completely characterized.  There was mild biliary dilation postcholecystectomy.  There was a diverticulum in the duodenum adjacent to the ampulla.  There was mild diverticulosis in the descending and sigmoid colon with mild circumferential wall thickening in the sigmoid colon, possibly mild uncomplicated diverticulitis.   MRI of the abdomen with and without contrast 07/2022:  This was a stand-up MRI and the examination was limited by body habitus and motion artifact.  The liver appeared normal.  Patient is postcholecystectomy.  There was a 7.2 mm lesion in the tail of the pancreas and a 1.2 cm lesion in the body of the pancreas.  There were bilateral renal cyst with a complex appearing cyst with thick irregular septation in the right kidney.

## 2024-05-07 ENCOUNTER — TELEPHONE ENCOUNTER (OUTPATIENT)
Dept: URBAN - METROPOLITAN AREA CLINIC 60 | Facility: CLINIC | Age: 89
End: 2024-05-07

## 2024-06-04 ENCOUNTER — LAB OUTSIDE AN ENCOUNTER (OUTPATIENT)
Dept: URBAN - METROPOLITAN AREA CLINIC 60 | Facility: CLINIC | Age: 89
End: 2024-06-04

## 2024-07-09 ENCOUNTER — DASHBOARD ENCOUNTERS (OUTPATIENT)
Age: 89
End: 2024-07-09

## 2024-07-16 ENCOUNTER — OFFICE VISIT (OUTPATIENT)
Dept: URBAN - METROPOLITAN AREA CLINIC 60 | Facility: CLINIC | Age: 89
End: 2024-07-16

## 2024-07-16 RX ORDER — BACLOFEN 10 MG/1
TABLET ORAL
Refills: 0 | COMMUNITY
Start: 2022-02-23

## 2024-07-16 RX ORDER — LATANOPROSTENE BUNOD 0.24 MG/ML
SOLUTION/ DROPS OPHTHALMIC
Refills: 0 | COMMUNITY
Start: 2022-05-18

## 2024-07-16 RX ORDER — OXCARBAZEPINE 150 MG/1
1 TABLET TABLET, FILM COATED ORAL TWICE A DAY
Refills: 0 | COMMUNITY
Start: 2022-05-18

## 2024-07-16 RX ORDER — ALPRAZOLAM 0.25 MG
TABLET ORAL
Refills: 0 | COMMUNITY
Start: 2022-05-18

## 2024-07-16 RX ORDER — OMEGA-3/DHA/EPA/FISH OIL 300-1000MG
CAPSULE ORAL
Refills: 0 | COMMUNITY
Start: 2022-05-18

## 2024-07-16 RX ORDER — NITROFURANTOIN MONOHYDRATE/MACROCRYSTALLINE 25; 75 MG/1; MG/1
CAPSULE ORAL
Qty: 10 CAPSULE | COMMUNITY

## 2024-07-16 RX ORDER — BRIMONIDINE TARTRATE, TIMOLOL MALEATE 2; 5 MG/ML; MG/ML
SOLUTION/ DROPS OPHTHALMIC
Refills: 0 | COMMUNITY
Start: 2022-05-18

## 2024-07-16 RX ORDER — MV-MIN/FOLIC/VIT K/LYCOP/COQ10 200-100MCG
CAPSULE ORAL
Refills: 0 | COMMUNITY
Start: 2022-05-18

## 2024-07-16 RX ORDER — BRINZOLAMIDE 10 MG/ML
SUSPENSION/ DROPS OPHTHALMIC
Refills: 0 | COMMUNITY
Start: 2022-05-18

## 2024-07-16 RX ORDER — TORSEMIDE 10 MG/1
TABLET ORAL
Qty: 30 TABLET | COMMUNITY

## 2024-07-16 RX ORDER — GABAPENTIN 100 MG/1
1 CAPSULE CAPSULE ORAL ONCE A DAY
Refills: 0 | COMMUNITY
Start: 2021-08-02

## 2024-07-16 RX ORDER — CHOLECALCIFEROL (VITAMIN D3) 50 MCG
TABLET ORAL
Refills: 0 | COMMUNITY
Start: 2022-05-18

## 2024-07-16 RX ORDER — VIBEGRON 75 MG/1
TABLET, FILM COATED ORAL
Qty: 30 TABLET | COMMUNITY

## 2024-07-16 RX ORDER — OXYCODONE HYDROCHLORIDE AND ACETAMINOPHEN 5; 325 MG/1; MG/1
TABLET ORAL
Qty: 60 TABLET | COMMUNITY

## 2024-10-02 ENCOUNTER — LAB OUTSIDE AN ENCOUNTER (OUTPATIENT)
Dept: URBAN - METROPOLITAN AREA CLINIC 60 | Facility: CLINIC | Age: 89
End: 2024-10-02

## 2024-10-02 ENCOUNTER — OFFICE VISIT (OUTPATIENT)
Dept: URBAN - METROPOLITAN AREA CLINIC 60 | Facility: CLINIC | Age: 89
End: 2024-10-02
Payer: MEDICARE

## 2024-10-02 VITALS
TEMPERATURE: 97.4 F | OXYGEN SATURATION: 96 % | HEART RATE: 74 BPM | HEIGHT: 64 IN | BODY MASS INDEX: 25.71 KG/M2 | SYSTOLIC BLOOD PRESSURE: 108 MMHG | DIASTOLIC BLOOD PRESSURE: 60 MMHG | WEIGHT: 150.6 LBS

## 2024-10-02 DIAGNOSIS — R13.19 ESOPHAGEAL DYSPHAGIA: ICD-10-CM

## 2024-10-02 DIAGNOSIS — K86.2 PANCREATIC CYST: ICD-10-CM

## 2024-10-02 DIAGNOSIS — R14.0 ABDOMINAL BLOATING: ICD-10-CM

## 2024-10-02 PROCEDURE — 99214 OFFICE O/P EST MOD 30 MIN: CPT

## 2024-10-02 RX ORDER — GABAPENTIN 100 MG/1
1 CAPSULE CAPSULE ORAL ONCE A DAY
Refills: 0 | Status: ACTIVE | COMMUNITY
Start: 2021-08-02

## 2024-10-02 RX ORDER — CHOLECALCIFEROL (VITAMIN D3) 50 MCG
TABLET ORAL
Refills: 0 | Status: ACTIVE | COMMUNITY
Start: 2022-05-18

## 2024-10-02 RX ORDER — OXCARBAZEPINE 150 MG/1
1 TABLET TABLET, FILM COATED ORAL TWICE A DAY
Refills: 0 | Status: ACTIVE | COMMUNITY
Start: 2022-05-18

## 2024-10-02 RX ORDER — BRIMONIDINE TARTRATE, TIMOLOL MALEATE 2; 5 MG/ML; MG/ML
SOLUTION/ DROPS OPHTHALMIC
Refills: 0 | Status: ACTIVE | COMMUNITY
Start: 2022-05-18

## 2024-10-02 RX ORDER — OXYCODONE HYDROCHLORIDE AND ACETAMINOPHEN 5; 325 MG/1; MG/1
TABLET ORAL
Qty: 60 TABLET | Status: ACTIVE | COMMUNITY

## 2024-10-02 RX ORDER — BACLOFEN 10 MG/1
TABLET ORAL
Refills: 0 | Status: ACTIVE | COMMUNITY
Start: 2022-02-23

## 2024-10-02 RX ORDER — MV-MIN/FOLIC/VIT K/LYCOP/COQ10 200-100MCG
CAPSULE ORAL
Refills: 0 | Status: ACTIVE | COMMUNITY
Start: 2022-05-18

## 2024-10-02 RX ORDER — ALPRAZOLAM 0.25 MG
TABLET ORAL
Refills: 0 | Status: ACTIVE | COMMUNITY
Start: 2022-05-18

## 2024-10-02 NOTE — HPI-TODAY'S VISIT:
Patient is a 89-year-old female coming in with multiple complaints.  She has had a long-term history of chronic upper abdominal pain as well as postprandial abdominal bloating.  She also describes a new symptom of sluggish swallowing denies food getting stuck but she does feel it take a long time to get down.  She has a history of chronic constipation currently having a BM every other day and typically will have to take an OTC laxative to help.  No signs of GI bleeding.  Previous pancreatic cyst imaged last year due for repeat imaging for surveillance.  Last colonoscopy was done in 2011 with an 8 mm polyp removed. Guadalupe is a 89-year-old female that presents today for follow-up.  GI history significant for pancreatic cyst, chronic constipation, complicated diverticulitis.  She was hospitalized 06/2023 with acute complicated sigmoid diverticulitis with perforation. She underwent sigmoid colon resection with colostomy and raman pouch. 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   Prior imaging summarized below. However, she had more recent CT with contrast 06/2023 and CT without contrast 01/2024. Per CT report 06/2023, the pancreatic cystic lesion appeared smaller. There was no mention of the cyst on CT without contrast 01/2024, but exam limited without contrast.  CT abdomen pelvis with and without contrast 05/2022: There was a pancreatic cyst in the proximal body of the pancreas measuring about 9 mm.  There is also a probable 2 mm pancreatic cyst in the uncinate process.  No pancreatic duct dilation.    CT abdomen and pelvis with contrast 04/2021:  There was a 7 mm lesion within the neck of the pancreas which could not be completely characterized.  There was mild biliary dilation postcholecystectomy.  There was a diverticulum in the duodenum adjacent to the ampulla.  There was mild diverticulosis in the descending and sigmoid colon with mild circumferential wall thickening in the sigmoid colon, possibly mild uncomplicated diverticulitis.   MRI of the abdomen with and without contrast 07/2022:  This was a stand-up MRI and the examination was limited by body habitus and motion artifact.  The liver appeared normal.  Patient is postcholecystectomy.  There was a 7.2 mm lesion in the tail of the pancreas and a 1.2 cm lesion in the body of the pancreas.  There were bilateral renal cyst with a complex appearing cyst with thick irregular septation in the right kidney.

## 2024-10-23 LAB — H PYLORI BREATH TEST: NOT DETECTED

## 2024-11-01 ENCOUNTER — TELEPHONE ENCOUNTER (OUTPATIENT)
Dept: URBAN - METROPOLITAN AREA CLINIC 60 | Facility: CLINIC | Age: 89
End: 2024-11-01

## 2024-11-18 ENCOUNTER — OFFICE VISIT (OUTPATIENT)
Dept: URBAN - METROPOLITAN AREA CLINIC 60 | Facility: CLINIC | Age: 89
End: 2024-11-18
Payer: MEDICARE

## 2024-11-18 VITALS
SYSTOLIC BLOOD PRESSURE: 112 MMHG | HEIGHT: 64 IN | OXYGEN SATURATION: 95 % | BODY MASS INDEX: 25.78 KG/M2 | TEMPERATURE: 98.2 F | WEIGHT: 151 LBS | DIASTOLIC BLOOD PRESSURE: 60 MMHG | HEART RATE: 63 BPM

## 2024-11-18 DIAGNOSIS — K86.2 PANCREATIC CYST: ICD-10-CM

## 2024-11-18 DIAGNOSIS — K59.00 CONSTIPATION, UNSPECIFIED CONSTIPATION TYPE: ICD-10-CM

## 2024-11-18 DIAGNOSIS — R14.0 ABDOMINAL BLOATING: ICD-10-CM

## 2024-11-18 DIAGNOSIS — R13.19 ESOPHAGEAL DYSPHAGIA: ICD-10-CM

## 2024-11-18 PROCEDURE — 99214 OFFICE O/P EST MOD 30 MIN: CPT

## 2024-11-18 RX ORDER — BRIMONIDINE TARTRATE, TIMOLOL MALEATE 2; 5 MG/ML; MG/ML
SOLUTION/ DROPS OPHTHALMIC
Refills: 0 | Status: ACTIVE | COMMUNITY
Start: 2022-05-18

## 2024-11-18 RX ORDER — BACLOFEN 10 MG/1
TABLET ORAL
Refills: 0 | Status: ACTIVE | COMMUNITY
Start: 2022-02-23

## 2024-11-18 RX ORDER — CHOLECALCIFEROL (VITAMIN D3) 50 MCG
TABLET ORAL
Refills: 0 | Status: ACTIVE | COMMUNITY
Start: 2022-05-18

## 2024-11-18 RX ORDER — OXCARBAZEPINE 150 MG/1
1 TABLET TABLET, FILM COATED ORAL TWICE A DAY
Refills: 0 | Status: ACTIVE | COMMUNITY
Start: 2022-05-18

## 2024-11-18 RX ORDER — ALPRAZOLAM 0.25 MG
TABLET ORAL
Refills: 0 | Status: ACTIVE | COMMUNITY
Start: 2022-05-18

## 2024-11-18 RX ORDER — MV-MIN/FOLIC/VIT K/LYCOP/COQ10 200-100MCG
CAPSULE ORAL
Refills: 0 | Status: ACTIVE | COMMUNITY
Start: 2022-05-18

## 2024-11-18 RX ORDER — GABAPENTIN 100 MG/1
1 CAPSULE CAPSULE ORAL ONCE A DAY
Refills: 0 | Status: ACTIVE | COMMUNITY
Start: 2021-08-02

## 2024-11-18 RX ORDER — OXYCODONE HYDROCHLORIDE AND ACETAMINOPHEN 5; 325 MG/1; MG/1
TABLET ORAL
Qty: 60 TABLET | Status: ACTIVE | COMMUNITY

## 2024-11-18 NOTE — HPI-TODAY'S VISIT:
We reviewed results at today's visit.  Patient is feeling overall very well.  Esophagram noted some tortuosity with mild tertiary contractions.  Patient with occasional sticking sensation in the esophagus but denies any food getting stuck.  H. pylori breath test negative.  Patient with occasional postprandial abdominal bloating and admits her "diet is not the best".  We discussed avoiding lactose completely instead of only some of the times.  CT abdomen noted stable cystic lesion in pancreatic body.  Currently having a BM every other day without pain or bleeding.  Appetite and weight stable.  Denies any brbpr, melena, abdominal pain, unintentional weight loss, early satiety, fever, chills, diarrhea, constipation, dysphagia, odynophagia, or reflux.   Last OV 10/2024: Patient is a 89-year-old female coming in with multiple complaints.  She has had a long-term history of chronic upper abdominal pain as well as postprandial abdominal bloating.  She also describes a new symptom of sluggish swallowing denies food getting stuck but she does feel it take a long time to get down.  She has a history of chronic constipation currently having a BM every other day and typically will have to take an OTC laxative to help.  No signs of GI bleeding.  Previous pancreatic cyst imaged last year due for repeat imaging for surveillance.  Last colonoscopy was done in 2011 with an 8 mm polyp removed. Guadalupe is a 89-year-old female that presents today for follow-up.  GI history significant for pancreatic cyst, chronic constipation, complicated diverticulitis.  She was hospitalized 06/2023 with acute complicated sigmoid diverticulitis with perforation. She underwent sigmoid colon resection with colostomy and raman pouch. 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   Prior imaging summarized below. However, she had more recent CT with contrast 06/2023 and CT without contrast 01/2024. Per CT report 06/2023, the pancreatic cystic lesion appeared smaller. There was no mention of the cyst on CT without contrast 01/2024, but exam limited without contrast.

## 2024-11-18 NOTE — HPI-PREVIOUS IMAGING
Barium swallow esophagram 11/13/2024 with tortuous course of esophagus likely due to elevation of left hemidiaphragm, mild tertiary contractions, no hiatal hernia, no significant GE reflux evident  CT abdomen with contrast 10/28/2024 noted normal liver, prior cholecystectomy, no main pancreatic duct dilation, tubular cystic lesion in the pancreatic body measuring 0.8 cm in length stable, additional tiny cystic lesion in anterior pancreatic head, no evidence of pancreatic mass  CT with contrast 06/2023 and CT without contrast 01/2024. Per CT report 06/2023, the pancreatic cystic lesion appeared smaller. There was no mention of the cyst on CT without contrast 01/2024, but exam limited without contrast.  CT abdomen pelvis with and without contrast 05/2022: There was a pancreatic cyst in the proximal body of the pancreas measuring about 9 mm. There is also a probable 2 mm pancreatic cyst in the uncinate process. No pancreatic duct dilation.   CT abdomen and pelvis with contrast 04/2021: There was a 7 mm lesion within the neck of the pancreas which could not be completely characterized. There was mild biliary dilation postcholecystectomy. There was a diverticulum in the duodenum adjacent to the ampulla. There was mild diverticulosis in the descending and sigmoid colon with mild circumferential wall thickening in the sigmoid colon, possibly mild uncomplicated diverticulitis.  MRI of the abdomen with and without contrast 07/2022: This was a stand-up MRI and the examination was limited by body habitus and motion artifact. The liver appeared normal. Patient is postcholecystectomy. There was a 7.2 mm lesion in the tail of the pancreas and a 1.2 cm lesion in the body of the pancreas. There were bilateral renal cyst with a complex appearing cyst with thick irregular septation in the right kidney.

## 2024-12-31 ENCOUNTER — OFFICE VISIT (OUTPATIENT)
Dept: URBAN - METROPOLITAN AREA CLINIC 60 | Facility: CLINIC | Age: 89
End: 2024-12-31

## 2025-01-06 ENCOUNTER — OFFICE VISIT (OUTPATIENT)
Dept: URBAN - METROPOLITAN AREA CLINIC 60 | Facility: CLINIC | Age: OVER 89
End: 2025-01-06

## 2025-01-24 ENCOUNTER — OFFICE VISIT (OUTPATIENT)
Dept: URBAN - METROPOLITAN AREA CLINIC 63 | Facility: CLINIC | Age: OVER 89
End: 2025-01-24

## 2025-03-17 ENCOUNTER — OFFICE VISIT (OUTPATIENT)
Dept: URBAN - METROPOLITAN AREA CLINIC 60 | Facility: CLINIC | Age: OVER 89
End: 2025-03-17
Payer: MEDICARE

## 2025-03-17 VITALS
BODY MASS INDEX: 26.87 KG/M2 | SYSTOLIC BLOOD PRESSURE: 124 MMHG | TEMPERATURE: 98.6 F | HEART RATE: 65 BPM | HEIGHT: 64 IN | OXYGEN SATURATION: 97 % | WEIGHT: 157.4 LBS | DIASTOLIC BLOOD PRESSURE: 62 MMHG | RESPIRATION RATE: 12 BRPM

## 2025-03-17 DIAGNOSIS — R13.19 ESOPHAGEAL DYSPHAGIA: ICD-10-CM

## 2025-03-17 DIAGNOSIS — K59.09 ACUTE CONSTIPATION IN CHILDHOOD: ICD-10-CM

## 2025-03-17 DIAGNOSIS — K86.2 PANCREATIC CYST: ICD-10-CM

## 2025-03-17 DIAGNOSIS — K21.9 GERD WITHOUT ESOPHAGITIS: ICD-10-CM

## 2025-03-17 PROBLEM — 266435005: Status: ACTIVE | Noted: 2025-03-17

## 2025-03-17 PROCEDURE — 99214 OFFICE O/P EST MOD 30 MIN: CPT

## 2025-03-17 RX ORDER — OXYCODONE HYDROCHLORIDE AND ACETAMINOPHEN 5; 325 MG/1; MG/1
TABLET ORAL
Qty: 60 TABLET | Status: ACTIVE | COMMUNITY

## 2025-03-17 RX ORDER — BACLOFEN 10 MG/1
TABLET ORAL
Refills: 0 | Status: ACTIVE | COMMUNITY
Start: 2022-02-23

## 2025-03-17 RX ORDER — MV-MIN/FOLIC/VIT K/LYCOP/COQ10 200-100MCG
CAPSULE ORAL
Refills: 0 | Status: ACTIVE | COMMUNITY
Start: 2022-05-18

## 2025-03-17 RX ORDER — BRIMONIDINE TARTRATE, TIMOLOL MALEATE 2; 5 MG/ML; MG/ML
SOLUTION/ DROPS OPHTHALMIC
Refills: 0 | Status: ACTIVE | COMMUNITY
Start: 2022-05-18

## 2025-03-17 RX ORDER — GABAPENTIN 100 MG/1
1 CAPSULE CAPSULE ORAL ONCE A DAY
Refills: 0 | Status: ACTIVE | COMMUNITY
Start: 2021-08-02

## 2025-03-17 RX ORDER — FAMOTIDINE 40 MG/1
1 TABLET AT BEDTIME TABLET, FILM COATED ORAL ONCE A DAY
Qty: 90 TABLET | Refills: 3 | OUTPATIENT
Start: 2025-03-17

## 2025-03-17 RX ORDER — CHOLECALCIFEROL (VITAMIN D3) 50 MCG
TABLET ORAL
Refills: 0 | Status: ACTIVE | COMMUNITY
Start: 2022-05-18

## 2025-03-17 RX ORDER — OXCARBAZEPINE 150 MG/1
1 TABLET TABLET, FILM COATED ORAL TWICE A DAY
Refills: 0 | Status: ACTIVE | COMMUNITY
Start: 2022-05-18

## 2025-03-17 RX ORDER — ALPRAZOLAM 0.25 MG
TABLET ORAL
Refills: 0 | Status: ACTIVE | COMMUNITY
Start: 2022-05-18

## 2025-03-17 NOTE — HPI-TODAY'S VISIT:
Patient coming in for follow-up visit.  She is overall feeling well.  Occasional dysphagia but admits that she eats very quickly and should be eating slower and chewing her food better.  Continues on lactose-free diet which helps with her previous symptoms of abdominal bloating.  Constipation doing well on MiraLAX as needed.  Occasional reflux not taking any PPI or H2 blocker.  Last OV 11/2024: We reviewed results at today's visit.  Patient is feeling overall very well.  Esophagram noted some tortuosity with mild tertiary contractions.  Patient with occasional sticking sensation in the esophagus but denies any food getting stuck.  H. pylori breath test negative.  Patient with occasional postprandial abdominal bloating and admits her "diet is not the best".  We discussed avoiding lactose completely instead of only some of the times.  CT abdomen noted stable cystic lesion in pancreatic body.  Currently having a BM every other day without pain or bleeding.  Appetite and weight stable.  Denies any brbpr, melena, abdominal pain, unintentional weight loss, early satiety, fever, chills, diarrhea, constipation, dysphagia, odynophagia, or reflux.   Last OV 10/2024: Patient is a 89-year-old female coming in with multiple complaints.  She has had a long-term history of chronic upper abdominal pain as well as postprandial abdominal bloating.  She also describes a new symptom of sluggish swallowing denies food getting stuck but she does feel it take a long time to get down.  She has a history of chronic constipation currently having a BM every other day and typically will have to take an OTC laxative to help.  No signs of GI bleeding.  Previous pancreatic cyst imaged last year due for repeat imaging for surveillance.  Last colonoscopy was done in 2011 with an 8 mm polyp removed. Guadalupe is a 89-year-old female that presents today for follow-up.  GI history significant for pancreatic cyst, chronic constipation, complicated diverticulitis.  She was hospitalized 06/2023 with acute complicated sigmoid diverticulitis with perforation. She underwent sigmoid colon resection with colostomy and raman pouch. 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   Prior imaging summarized below. However, she had more recent CT with contrast 06/2023 and CT without contrast 01/2024. Per CT report 06/2023, the pancreatic cystic lesion appeared smaller. There was no mention of the cyst on CT without contrast 01/2024, but exam limited without contrast.

## 2025-05-13 ENCOUNTER — OFFICE VISIT (OUTPATIENT)
Dept: URBAN - METROPOLITAN AREA CLINIC 60 | Facility: CLINIC | Age: OVER 89
End: 2025-05-13

## 2025-06-09 ENCOUNTER — LAB OUTSIDE AN ENCOUNTER (OUTPATIENT)
Dept: URBAN - METROPOLITAN AREA CLINIC 60 | Facility: CLINIC | Age: OVER 89
End: 2025-06-09

## 2025-06-09 ENCOUNTER — OFFICE VISIT (OUTPATIENT)
Dept: URBAN - METROPOLITAN AREA CLINIC 60 | Facility: CLINIC | Age: OVER 89
End: 2025-06-09
Payer: MEDICARE

## 2025-06-09 DIAGNOSIS — K86.2 PANCREATIC CYST: ICD-10-CM

## 2025-06-09 DIAGNOSIS — R14.0 ABDOMINAL BLOATING: ICD-10-CM

## 2025-06-09 DIAGNOSIS — R13.19 ESOPHAGEAL DYSPHAGIA: ICD-10-CM

## 2025-06-09 DIAGNOSIS — K59.09 ACUTE CONSTIPATION IN CHILDHOOD: ICD-10-CM

## 2025-06-09 PROCEDURE — 99214 OFFICE O/P EST MOD 30 MIN: CPT

## 2025-06-09 RX ORDER — BRIMONIDINE TARTRATE, TIMOLOL MALEATE 2; 5 MG/ML; MG/ML
SOLUTION/ DROPS OPHTHALMIC
Refills: 0 | Status: ACTIVE | COMMUNITY
Start: 2022-05-18

## 2025-06-09 RX ORDER — ALPRAZOLAM 0.25 MG
TABLET ORAL
Refills: 0 | Status: ACTIVE | COMMUNITY
Start: 2022-05-18

## 2025-06-09 RX ORDER — BACLOFEN 10 MG/1
TABLET ORAL
Refills: 0 | Status: ACTIVE | COMMUNITY
Start: 2022-02-23

## 2025-06-09 RX ORDER — OXCARBAZEPINE 150 MG/1
1 TABLET TABLET, FILM COATED ORAL TWICE A DAY
Refills: 0 | Status: ACTIVE | COMMUNITY
Start: 2022-05-18

## 2025-06-09 RX ORDER — CHOLECALCIFEROL (VITAMIN D3) 50 MCG
TABLET ORAL
Refills: 0 | Status: ACTIVE | COMMUNITY
Start: 2022-05-18

## 2025-06-09 RX ORDER — GABAPENTIN 100 MG/1
1 CAPSULE CAPSULE ORAL ONCE A DAY
Refills: 0 | Status: ACTIVE | COMMUNITY
Start: 2021-08-02

## 2025-06-09 RX ORDER — MV-MIN/FOLIC/VIT K/LYCOP/COQ10 200-100MCG
CAPSULE ORAL
Refills: 0 | Status: ACTIVE | COMMUNITY
Start: 2022-05-18

## 2025-06-09 RX ORDER — OXYCODONE HYDROCHLORIDE AND ACETAMINOPHEN 5; 325 MG/1; MG/1
TABLET ORAL
Qty: 60 TABLET | Status: ACTIVE | COMMUNITY

## 2025-06-09 RX ORDER — FAMOTIDINE 40 MG/1
1 TABLET AT BEDTIME TABLET, FILM COATED ORAL ONCE A DAY
Qty: 90 TABLET | Refills: 3 | Status: ACTIVE | COMMUNITY
Start: 2025-03-17

## 2025-06-09 NOTE — HPI-TODAY'S VISIT:
Patient is coming for follow-up visit accompanied by her .  She is overall doing well.  Reports it is hard for her to remember her symptoms as "I am 89".  She has had a bit more constipation since last visit and we discussed increasing prune juice to daily instead of as needed as it seems to work well for her.  Additionally keeping the MiraLAX 3 times per week.  No further issues with reflux.  Very occasional dysphagia but denies emesis, regurgitation.  No emergent symptoms.  OV 3/2025: Patient coming in for follow-up visit.  She is overall feeling well.  Occasional dysphagia but admits that she eats very quickly and should be eating slower and chewing her food better.  Continues on lactose-free diet which helps with her previous symptoms of abdominal bloating.  Constipation doing well on MiraLAX as needed.  Occasional reflux not taking any PPI or H2 blocker.  Last OV 11/2024: We reviewed results at today's visit.  Patient is feeling overall very well.  Esophagram noted some tortuosity with mild tertiary contractions.  Patient with occasional sticking sensation in the esophagus but denies any food getting stuck.  H. pylori breath test negative.  Patient with occasional postprandial abdominal bloating and admits her "diet is not the best".  We discussed avoiding lactose completely instead of only some of the times.  CT abdomen noted stable cystic lesion in pancreatic body.  Currently having a BM every other day without pain or bleeding.  Appetite and weight stable.  Denies any brbpr, melena, abdominal pain, unintentional weight loss, early satiety, fever, chills, diarrhea, constipation, dysphagia, odynophagia, or reflux.   Last OV 10/2024: Patient is a 89-year-old female coming in with multiple complaints.  She has had a long-term history of chronic upper abdominal pain as well as postprandial abdominal bloating.  She also describes a new symptom of sluggish swallowing denies food getting stuck but she does feel it take a long time to get down.  She has a history of chronic constipation currently having a BM every other day and typically will have to take an OTC laxative to help.  No signs of GI bleeding.  Previous pancreatic cyst imaged last year due for repeat imaging for surveillance.  Last colonoscopy was done in 2011 with an 8 mm polyp removed. Guadalupe is a 89-year-old female that presents today for follow-up.  GI history significant for pancreatic cyst, chronic constipation, complicated diverticulitis.  She was hospitalized 06/2023 with acute complicated sigmoid diverticulitis with perforation. She underwent sigmoid colon resection with colostomy and raman pouch. 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   Prior imaging summarized below. However, she had more recent CT with contrast 06/2023 and CT without contrast 01/2024. Per CT report 06/2023, the pancreatic cystic lesion appeared smaller. There was no mention of the cyst on CT without contrast 01/2024, but exam limited without contrast.

## 2025-07-03 ENCOUNTER — TELEPHONE ENCOUNTER (OUTPATIENT)
Dept: URBAN - METROPOLITAN AREA CLINIC 60 | Facility: CLINIC | Age: OVER 89
End: 2025-07-03

## 2025-07-10 ENCOUNTER — TELEPHONE ENCOUNTER (OUTPATIENT)
Dept: URBAN - METROPOLITAN AREA CLINIC 60 | Facility: CLINIC | Age: OVER 89
End: 2025-07-10

## 2025-07-21 ENCOUNTER — TELEPHONE ENCOUNTER (OUTPATIENT)
Dept: URBAN - METROPOLITAN AREA CLINIC 60 | Facility: CLINIC | Age: OVER 89
End: 2025-07-21

## 2025-07-22 ENCOUNTER — LAB OUTSIDE AN ENCOUNTER (OUTPATIENT)
Dept: URBAN - METROPOLITAN AREA CLINIC 60 | Facility: CLINIC | Age: OVER 89
End: 2025-07-22

## 2025-07-30 ENCOUNTER — TELEPHONE ENCOUNTER (OUTPATIENT)
Dept: URBAN - METROPOLITAN AREA CLINIC 60 | Facility: CLINIC | Age: OVER 89
End: 2025-07-30

## 2025-08-04 ENCOUNTER — TELEPHONE ENCOUNTER (OUTPATIENT)
Dept: URBAN - METROPOLITAN AREA CLINIC 60 | Facility: CLINIC | Age: OVER 89
End: 2025-08-04